# Patient Record
Sex: MALE | Race: BLACK OR AFRICAN AMERICAN | NOT HISPANIC OR LATINO | Employment: OTHER | URBAN - METROPOLITAN AREA
[De-identification: names, ages, dates, MRNs, and addresses within clinical notes are randomized per-mention and may not be internally consistent; named-entity substitution may affect disease eponyms.]

---

## 2017-12-17 ENCOUNTER — HOSPITAL ENCOUNTER (EMERGENCY)
Facility: HOSPITAL | Age: 72
Discharge: HOME | End: 2017-12-17
Attending: EMERGENCY MEDICINE | Admitting: EMERGENCY MEDICINE
Payer: MEDICARE

## 2017-12-17 VITALS
SYSTOLIC BLOOD PRESSURE: 114 MMHG | DIASTOLIC BLOOD PRESSURE: 55 MMHG | WEIGHT: 165 LBS | HEART RATE: 85 BPM | RESPIRATION RATE: 16 BRPM | OXYGEN SATURATION: 96 % | TEMPERATURE: 99.5 F

## 2017-12-17 DIAGNOSIS — F10.929 ALCOHOL INTOXICATION (HCC): Primary | ICD-10-CM

## 2017-12-17 LAB — ETHANOL EXG-MCNC: 0.11 MG/DL

## 2017-12-17 PROCEDURE — 82075 ASSAY OF BREATH ETHANOL: CPT | Performed by: EMERGENCY MEDICINE

## 2017-12-17 PROCEDURE — 99284 EMERGENCY DEPT VISIT MOD MDM: CPT

## 2017-12-17 NOTE — ED NOTES
Pt  Unable to perform breath POCT alcohol breath test after multiple attempts and step by step  instructions        Jessika Taylor  12/17/17 8974

## 2017-12-17 NOTE — ED ATTENDING ATTESTATION
Shavon Prasad MD, saw and evaluated the patient  I have discussed the patient with the resident/non-physician practitioner and agree with the resident's/non-physician practitioner's findings, Plan of Care, and MDM as documented in the resident's/non-physician practitioner's note, except where noted  All available labs and Radiology studies were reviewed  At this point I agree with the current assessment done in the Emergency Department  I have conducted an independent evaluation of this patient including a focused history of:    Emergency Department Note- Franne Sears Severe 71 y o  male MRN: [id number]    Unit/Bed#: ED 23 Encounter: [id number]    Franne Sears Severe is a 71 y o  male who presents with   Chief Complaint   Patient presents with    Alcohol Intoxication     EMS was dispatched to house for medical alarm, pt found to be intoxicated  [facility]  History of Present Illness   HPI:  Franne Sears Severe is a 71 y o  male who presents for evaluation of:  Acute alcohol intoxication  The patient reportedly pressed his medical alert bracelet initiating any EMS dispatched to his house  On EMS arrival, the patient was noted to be intoxicated with alcohol  The patient was brought to the ER for evaluation of the delirium secondary to intoxication  Review of Systems   Unable to perform ROS: Mental status change (Review of systems is unobtainable secondary to alcohol-induced intoxication and delirium )       Historical Information   Past Medical History:   Diagnosis Date    Arthritis      History reviewed  No pertinent surgical history    Social History   History   Alcohol Use No     History   Drug Use No     History   Smoking Status    Current Every Day Smoker   Smokeless Tobacco    Never Used     Family History: non-contributory    Meds/Allergies   all medications and allergies reviewed  No Known Allergies    Objective   First Vitals:   Blood Pressure: 137/76 (12/17/17 0257)  Pulse: 97 (12/17/17 257)  Temperature: 99 5 °F (37 5 °C) (17)  Respirations: 16 (17)  Weight - Scale: 74 8 kg (165 lb) (17)  SpO2: 97 % (17)    Current Vitals:   Blood Pressure: 114/55 (17 0930)  Pulse: 85 (1730)  Temperature: 99 5 °F (37 5 °C) (17)  Respirations: 16 (17)  Weight - Scale: 74 8 kg (165 lb) (17)  SpO2: 96 % (1730)    No intake or output data in the 24 hours ending 17 1428    Invasive Devices          No matching active lines, drains, or airways          Physical Exam   Constitutional: He appears well-nourished  No distress  Intoxicated male presents in no distress   HENT:   Head: Normocephalic and atraumatic  Eyes: Conjunctivae are normal  Pupils are equal, round, and reactive to light  Neck: Normal range of motion  Neck supple  Cardiovascular: Normal rate and regular rhythm  Pulmonary/Chest: Effort normal and breath sounds normal    Abdominal: Soft  Bowel sounds are normal    Musculoskeletal: Normal range of motion  He exhibits no deformity  Neurological: He is alert  The patient is only oriented to name   Skin: Skin is warm and dry  Psychiatric:   Decision making capacity, judgment, and thought content are unobtainable secondary to alcohol-induced delirium   Nursing note and vitals reviewed  Medical Decision Makin  Acute alcohol intoxication:  Plan to observe until sober  Recent Results (from the past 36 hour(s))   POCT alcohol breath test    Collection Time: 17  9:01 AM   Result Value Ref Range    EXTBreath Alcohol 0 108      No orders to display         Portions of the record may have been created with voice recognition software  Occasional wrong word or "sound a like" substitutions may have occurred due to the inherent limitations of voice recognition software  Read the chart carefully and recognize, using context, where substitutions have occurred

## 2017-12-17 NOTE — DISCHARGE INSTRUCTIONS
Alcohol Intoxication   WHAT YOU NEED TO KNOW:   Alcohol intoxication is a harmful physical condition caused when you drink more alcohol than your body can handle  It is also called ethanol poisoning, or being drunk  DISCHARGE INSTRUCTIONS:   Medicine: You may be given medicine to manage the signs and symptoms of alcohol intoxication  Take your medicine as directed  Contact your healthcare provider if you think your medicine is not helping or if you have side effects  Tell him if you are allergic to any medicine  Keep a list of the medicines, vitamins, and herbs you take  Include the amounts, and when and why you take them  Bring the list or the pill bottles to follow-up visits  Keep the list with you in case of emergency  Follow up with your healthcare provider as directed:  Write down your questions so you remember to ask them during your visits  Limit or avoid alcohol:  Men should not have more than 2 drinks per day  Women should not have more than 1 drink per day  A drink is 12 ounces of beer, 5 ounces of wine, or 1½ ounces of liquor  Do not drive or operate machines when you drink alcohol:  Make sure you always have someone to drive you when you drink alcohol  For more information:   · Alcoholics Anonymous  Web Address: http://www [employer]/  Contact your healthcare provider if:   · You need help to stop drinking alcohol  · You have trouble with work or school because you drink too much alcohol  · You have physical or verbal fights because of alcohol  · You have questions or concerns about your condition or care  Return to the emergency department if:   · You have sudden trouble breathing or chest pain  · You have a seizure  · You feel sad enough to harm yourself or others  · You have hallucinations (you see or hear things that are not real)  · You cannot stop vomiting  · You were in an accident because of alcohol    © 2017 [employer] Information is for End User's use only and may not be sold, redistributed or otherwise used for commercial purposes  All illustrations and images included in CareNotes® are the copyrighted property of [employer]  or [employer] Kapad[employer]  The above information is an  only  It is not intended as medical advice for individual conditions or treatments  Talk to your doctor, nurse or pharmacist before following any medical regimen to see if it is safe and effective for you

## 2017-12-17 NOTE — ED NOTES
Several more attempts made for POCT alcohol unsuccessful   Patient given water to drink     Jerry Gray RN  12/17/17 0425

## 2017-12-17 NOTE — ED PROVIDER NOTES
History  Chief Complaint   Patient presents with    Alcohol Intoxication     EMS was dispatched to [place] for medical alarm, pt found to be intoxicated  [facility]  HPI   61-year-old male presents to the emergency department via EMS after medical alert bracelet was triggered  Upon arrival, EMS found patient to be intoxicated  They brought him to the hospital for further evaluation  On presentation, patient is clearly intoxicated  He denies any symptoms and would like to go home  None       Past Medical History:   Diagnosis Date    Arthritis        History reviewed  No pertinent surgical history  History reviewed  No pertinent family history  I have reviewed and agree with the history as documented  Social History   Substance Use Topics    Smoking status: Current Every Day Smoker    Smokeless tobacco: Never Used    Alcohol use No        Review of Systems   Unable to perform ROS: Other (intoxicated)       Physical Exam  ED Triage Vitals   Temperature Pulse Respirations Blood Pressure SpO2   12/17/17 0257 12/17/17 0257 12/17/17 0257 12/17/17 0257 12/17/17 0257   99 5 °F (37 5 °C) 97 16 137/76 97 %      Temp src Heart Rate Source Patient Position - Orthostatic VS BP Location FiO2 (%)   -- -- -- -- --             Pain Score       12/17/17 0302       No Pain           Orthostatic Vital Signs  Vitals:    12/17/17 0257 12/17/17 0545 12/17/17 0630 12/17/17 0707   BP: 137/76   104/50   Pulse: 97 94 92 92       Physical Exam   Constitutional: He appears well-nourished  No distress  HENT:   Head: Normocephalic and atraumatic  Eyes: EOM are normal    Neck: Normal range of motion  Neck supple  Cardiovascular: Normal rate and regular rhythm  Pulmonary/Chest: Effort normal and breath sounds normal  No respiratory distress  Abdominal: Soft  He exhibits no distension  There is no tenderness  Musculoskeletal: Normal range of motion  Neurological: He is alert  Skin: Skin is warm and dry  He is not diaphoretic  Psychiatric:   Appears intoxicated   Nursing note and vitals reviewed  ED Medications  Medications - No data to display    Diagnostic Studies  Results Reviewed     Procedure Component Value Units Date/Time    POCT alcohol breath test [[id number]]  (Normal) Resulted:  12/17/17 0901    Lab Status:  Final result Updated:  12/17/17 0901     EXTBreath Alcohol 0 108                 No orders to display         Procedures  Procedures      Phone Consults  ED Phone Contact    ED Course  ED Course as of Dec 17 0941   Sun Dec 17, 2017   0940 Son arrived to pick patient up                 MDM  Number of Diagnoses or Management Options  Diagnosis management comments: 64-year-old male brought in for alcohol intoxication  Will discharge home when sober, safe for discharge  CritCare Time    Disposition  Final diagnoses:   Alcohol intoxication ([address] )     Time reflects when diagnosis was documented in both MDM as applicable and the Disposition within this note     Time User Action Codes Description Comment    12/17/2017  8:07 AM Jorge Crimes Add [F10 [id number]] Alcohol intoxication [facility])       ED Disposition     ED Disposition Condition Comment    Discharge  Arthurel Severe discharge to home/self care  Condition at discharge: Good        Follow-up Information     Follow up With Specialties Details Why Contact Info Additional [address] Emergency Department Emergency Medicine  As needed, If symptoms worsen [address] [facility] ED, [address], [address]        Patient's Medications    No medications on file     No discharge procedures on file  ED Provider  Attending physically available and evaluated Arthur Severe  I managed the patient along with the ED Attending      Electronically Signed by         Rashida Cagle MD  Resident  12/17/17 9488